# Patient Record
Sex: FEMALE | Race: WHITE | NOT HISPANIC OR LATINO | Employment: FULL TIME | URBAN - METROPOLITAN AREA
[De-identification: names, ages, dates, MRNs, and addresses within clinical notes are randomized per-mention and may not be internally consistent; named-entity substitution may affect disease eponyms.]

---

## 2022-05-16 DIAGNOSIS — N20.0 CALCULUS OF KIDNEY: Primary | ICD-10-CM

## 2022-05-17 ENCOUNTER — OFFICE VISIT (OUTPATIENT)
Dept: UROLOGY | Facility: CLINIC | Age: 52
End: 2022-05-17

## 2022-05-17 VITALS
WEIGHT: 174 LBS | HEART RATE: 79 BPM | SYSTOLIC BLOOD PRESSURE: 120 MMHG | BODY MASS INDEX: 32.02 KG/M2 | DIASTOLIC BLOOD PRESSURE: 72 MMHG | HEIGHT: 62 IN

## 2022-05-17 DIAGNOSIS — R10.9 ABDOMINAL PAIN, UNSPECIFIED ABDOMINAL LOCATION: ICD-10-CM

## 2022-05-17 DIAGNOSIS — N20.0 CALCULUS OF KIDNEY: ICD-10-CM

## 2022-05-17 PROCEDURE — 99204 PR OFFICE/OUTPT VISIT, NEW, LEVL IV, 45-59 MIN: ICD-10-PCS | Mod: S$GLB,,, | Performed by: UROLOGY

## 2022-05-17 PROCEDURE — 99204 OFFICE O/P NEW MOD 45 MIN: CPT | Mod: S$GLB,,, | Performed by: UROLOGY

## 2022-05-17 RX ORDER — ACYCLOVIR 200 MG/1
CAPSULE ORAL
COMMUNITY
Start: 2022-05-07

## 2022-05-17 NOTE — PROGRESS NOTES
Subjective:       Patient ID: Rosalinda Stewart is a 52 y.o. female.    Chief Complaint: Nephrolithiasis      HPI:  52-year-old female with a 9 mm left renal pelvic stone on plain film.  She has not had any CT imaging patient is symptomatic her total left-sided    Past Medical History:   Past Medical History:   Diagnosis Date    Anxiety     Fever blister     Kidney stone     Kidney stones     Mental disorder        Past Surgical Historical:   Past Surgical History:   Procedure Laterality Date    BREAST SURGERY      ENDOMETRIAL ABLATION      RENAL ARTERY STENT      TUBAL LIGATION          Medications:   Medication List with Changes/Refills   Current Medications    ACYCLOVIR (ZOVIRAX) 200 MG CAPSULE    Take by mouth 5 (five) times daily.   Discontinued Medications    DIAZEPAM (VALIUM) 5 MG TABLET        HYDROMORPHONE (DILAUDID) 2 MG TABLET        HYOSCYAMINE (LEVSIN/SL) 0.125 MG SUBL        ONDANSETRON (ZOFRAN) 4 MG TABLET        SERTRALINE (ZOLOFT) 100 MG TABLET        ZOLPIDEM (AMBIEN) 10 MG TAB            Past Social History:   Social History     Socioeconomic History    Marital status:    Occupational History    Occupation: dental assistant     Employer: [email or web address]   Tobacco Use    Smoking status: Never Smoker    Smokeless tobacco: Never Used   Substance and Sexual Activity    Alcohol use: Never    Drug use: No    Sexual activity: Not Currently     Partners: Male     Birth control/protection: Surgical       Allergies:   Review of patient's allergies indicates:   Allergen Reactions    Latex, natural rubber Anaphylaxis        Family History:   Family History   Problem Relation Age of Onset    Stroke Mother     Cancer Father     Heart disease Father     Melanoma Neg Hx     Psoriasis Neg Hx     Lupus Neg Hx     Eczema Neg Hx     Acne Neg Hx     Breast cancer Neg Hx     Colon cancer Neg Hx     Ovarian cancer Neg Hx         Review of Systems:  Review of Systems   Constitutional:  Negative for activity change and appetite change.   HENT: Negative for congestion and dental problem.    Respiratory: Negative for chest tightness and shortness of breath.    Cardiovascular: Negative for chest pain.   Gastrointestinal: Negative for abdominal distention and abdominal pain.   Genitourinary: Negative for decreased urine volume, difficulty urinating, dyspareunia, dysuria, enuresis, flank pain, frequency, genital sores, hematuria, pelvic pain and urgency.   Musculoskeletal: Negative for back pain and neck pain.   Allergic/Immunologic: Negative for immunocompromised state.   Neurological: Negative for dizziness.   Hematological: Negative for adenopathy.   Psychiatric/Behavioral: Negative for agitation, behavioral problems and confusion.       Physical Exam:  Physical Exam  Constitutional:       Appearance: She is well-developed.   HENT:      Head: Normocephalic and atraumatic.      Right Ear: External ear normal.      Left Ear: External ear normal.   Eyes:      Conjunctiva/sclera: Conjunctivae normal.   Neck:      Vascular: No JVD.   Cardiovascular:      Rate and Rhythm: Normal rate and regular rhythm.   Pulmonary:      Effort: Pulmonary effort is normal. No respiratory distress.      Breath sounds: Normal breath sounds. No wheezing.   Abdominal:      General: There is no distension.      Palpations: Abdomen is soft.      Tenderness: There is no abdominal tenderness. There is no rebound.   Musculoskeletal:         General: Normal range of motion.      Cervical back: Normal range of motion.   Skin:     General: Skin is warm and dry.      Findings: No erythema or rash.   Neurological:      Mental Status: She is alert and oriented to person, place, and time.   Psychiatric:         Behavior: Behavior normal.         Assessment/Plan:       Problem List Items Addressed This Visit    None     Visit Diagnoses     Calculus of kidney        Abdominal pain, unspecified abdominal location        Relevant Orders    CT  Renal Stone Study ABD Pelvis WO             52-year-old female with left flank pain.  She has a 9 mm calcification of the left kidney.  We will order CT stone protocol now and likely proceed with ureteroscopy

## 2022-05-19 ENCOUNTER — CLINICAL SUPPORT (OUTPATIENT)
Dept: UROLOGY | Facility: CLINIC | Age: 52
End: 2022-05-19

## 2022-05-19 DIAGNOSIS — N20.0 CALCULUS OF KIDNEY: Primary | ICD-10-CM

## 2022-05-19 LAB
ANION GAP SERPL CALC-SCNC: 2 MMOL/L (ref 3–11)
APPEARANCE, UA: CLEAR
BACTERIA SPEC CULT: ABNORMAL /HPF
BASOPHILS NFR BLD: 0.7 % (ref 0–3)
BILIRUB UR QL STRIP: NEGATIVE MG/DL
BUN SERPL-MCNC: 13 MG/DL (ref 7–18)
BUN/CREAT SERPL: 19.11 RATIO (ref 7–18)
CALCIUM SERPL-MCNC: 9.1 MG/DL (ref 8.8–10.5)
CHLORIDE SERPL-SCNC: 106 MMOL/L (ref 100–108)
CO2 SERPL-SCNC: 33 MMOL/L (ref 21–32)
COLOR UR: ABNORMAL
CREAT SERPL-MCNC: 0.68 MG/DL (ref 0.55–1.02)
EOSINOPHIL NFR BLD: 2.3 % (ref 1–3)
ERYTHROCYTE [DISTWIDTH] IN BLOOD BY AUTOMATED COUNT: 12.2 % (ref 12.5–18)
GFR ESTIMATION: > 60
GLUCOSE (UA): NORMAL MG/DL
GLUCOSE SERPL-MCNC: 124 MG/DL (ref 70–110)
HCG QUALITATIVE: NEGATIVE
HCT VFR BLD AUTO: 40.4 % (ref 37–47)
HGB BLD-MCNC: 13.5 G/DL (ref 12–16)
HGB UR QL STRIP: 150 /UL
KETONES UR QL STRIP: NEGATIVE MG/DL
LEUKOCYTE ESTERASE UR QL STRIP: 25 /UL
LYMPHOCYTES NFR BLD: 44.2 % (ref 25–40)
MCH RBC QN AUTO: 30.6 PG (ref 27–31.2)
MCHC RBC AUTO-ENTMCNC: 33.4 G/DL (ref 31.8–35.4)
MCV RBC AUTO: 91.6 FL (ref 80–97)
MONOCYTES NFR BLD: 8 % (ref 1–15)
NEUTROPHILS # BLD AUTO: 1.96 10*3/UL (ref 1.8–7.7)
NEUTROPHILS NFR BLD: 44.6 % (ref 37–80)
NITRITE UR QL STRIP: NEGATIVE
NUCLEATED RED BLOOD CELLS: 0 %
PH UR STRIP: 7 PH (ref 5–9)
PLATELETS: 275 10*3/UL (ref 142–424)
POTASSIUM SERPL-SCNC: 4 MMOL/L (ref 3.6–5.2)
PROT UR QL STRIP: ABNORMAL MG/DL
RBC # BLD AUTO: 4.41 10*6/UL (ref 4.2–5.4)
RBC #/AREA URNS HPF: ABNORMAL /HPF (ref 0–2)
SERVICE COMMENT 03: ABNORMAL
SODIUM BLD-SCNC: 141 MMOL/L (ref 135–145)
SP GR UR STRIP: 1.01 (ref 1–1.03)
SPECIMEN COLLECTION METHOD, URINE: ABNORMAL
SQUAMOUS EPITHELIAL, UA: ABNORMAL /LPF
UROBILINOGEN UR STRIP-ACNC: NORMAL MG/DL
WBC # BLD: 4.4 10*3/UL (ref 4.6–10.2)
WBC #/AREA URNS HPF: ABNORMAL /HPF (ref 0–5)

## 2022-05-19 NOTE — PROGRESS NOTES
Pre-op instructions and surgery consent L ESWL reviewed with pt. Pt verbalized understanding. Surgery consent signed by pt.

## 2022-05-20 ENCOUNTER — OUTSIDE PLACE OF SERVICE (OUTPATIENT)
Dept: UROLOGY | Facility: CLINIC | Age: 52
End: 2022-05-20

## 2022-05-20 ENCOUNTER — NURSE TRIAGE (OUTPATIENT)
Dept: ADMINISTRATIVE | Facility: CLINIC | Age: 52
End: 2022-05-20

## 2022-05-20 PROCEDURE — 50590 FRAGMENTING OF KIDNEY STONE: CPT | Mod: LT,,, | Performed by: UROLOGY

## 2022-05-20 PROCEDURE — 50590 PR FRAGMENT KIDNEY STONE/ ESWL: ICD-10-PCS | Mod: LT,,, | Performed by: UROLOGY

## 2022-05-20 NOTE — TELEPHONE ENCOUNTER
.  Pt's daughter called and mom was in severe pain. Pt had lithotripsy today and was passing fragments and was on the toilet and passed out and was vomiting. [place]. Pt cant get off the floor and told the daughter to call 911. Pts care advice is 911 pt asked about where to go and told her ambulance will probably take her to the nearest facility   .  Reason for Disposition   Shock suspected (e.g., cold/pale/clammy skin, too weak to stand, low BP, rapid pulse)    Additional Information   Negative: Passed out (i.e., lost consciousness, collapsed and was not responding)    Protocols used: BACK PAIN-A-AH

## 2022-05-23 ENCOUNTER — HOSPITAL ENCOUNTER (OUTPATIENT)
Dept: RADIOLOGY | Facility: CLINIC | Age: 52
Discharge: HOME | End: 2022-05-23
Attending: NURSE PRACTITIONER

## 2022-05-23 ENCOUNTER — OFFICE VISIT (OUTPATIENT)
Dept: UROLOGY | Facility: CLINIC | Age: 52
End: 2022-05-23

## 2022-05-23 ENCOUNTER — TELEPHONE (OUTPATIENT)
Dept: UROLOGY | Facility: CLINIC | Age: 52
End: 2022-05-23

## 2022-05-23 VITALS
SYSTOLIC BLOOD PRESSURE: 130 MMHG | HEIGHT: 62 IN | BODY MASS INDEX: 31.65 KG/M2 | WEIGHT: 172 LBS | HEART RATE: 80 BPM | DIASTOLIC BLOOD PRESSURE: 80 MMHG

## 2022-05-23 DIAGNOSIS — N20.0 KIDNEY STONE: ICD-10-CM

## 2022-05-23 DIAGNOSIS — N20.0 KIDNEY STONE: Primary | ICD-10-CM

## 2022-05-23 PROCEDURE — 74018 RADEX ABDOMEN 1 VIEW: CPT | Mod: TC,,, | Performed by: UROLOGY

## 2022-05-23 PROCEDURE — 99024 POSTOP FOLLOW-UP VISIT: CPT | Mod: S$GLB,,, | Performed by: NURSE PRACTITIONER

## 2022-05-23 PROCEDURE — 74018 XR KUB: ICD-10-PCS | Mod: TC,,, | Performed by: UROLOGY

## 2022-05-23 PROCEDURE — 99024 PR POST-OP FOLLOW-UP VISIT: ICD-10-PCS | Mod: S$GLB,,, | Performed by: NURSE PRACTITIONER

## 2022-05-23 PROCEDURE — 74018 XR KUB: ICD-10-PCS | Mod: 26,,, | Performed by: RADIOLOGY

## 2022-05-23 PROCEDURE — 74018 RADEX ABDOMEN 1 VIEW: CPT | Mod: 26,,, | Performed by: RADIOLOGY

## 2022-05-23 RX ORDER — PHENAZOPYRIDINE HYDROCHLORIDE 200 MG/1
200 TABLET, FILM COATED ORAL 3 TIMES DAILY PRN
COMMUNITY

## 2022-05-23 RX ORDER — ALPRAZOLAM 0.25 MG/1
0.25 TABLET ORAL
COMMUNITY

## 2022-05-23 RX ORDER — HYOSCYAMINE SULFATE 0.125 MG
0.12 TABLET ORAL
COMMUNITY
Start: 2022-05-21 | End: 2022-05-25 | Stop reason: SDUPTHER

## 2022-05-23 RX ORDER — ONDANSETRON 4 MG/1
4 TABLET, ORALLY DISINTEGRATING ORAL
COMMUNITY
Start: 2022-05-21

## 2022-05-23 RX ORDER — CIPROFLOXACIN 500 MG/1
500 TABLET ORAL
COMMUNITY

## 2022-05-23 NOTE — PROGRESS NOTES
"Subjective:       Patient ID: Rosalinda Stewart is a 52 y.o. female.    Chief Complaint: Nephrolithiasis (Pt reports she had to go to ED at [facility] Saturday for severe pain, ED doctor had to back in and "flush" because stone debris was stacked in ureter )      HPI: 52-year-old female, patient of Dr. Strauss, presents post ER visit.  Patient had and ESWL on 05/20/2021.  Patient started developing some left flank pain and went to the emergency room.  Patient was found to have obstructing stone fragments in the left ureter.  She underwent a ureteroscopic laser lithotripsy, ureteral dilation, and a urethral stent placement.    Patient presents today stating she was told she could have the stent removed today.  Patient is request have the stent removed.  She states it is very painful.  States he is unable to sleep.  States he is having urinary frequency and bladder spasms.  She denies any blood in urine.  Denies any fever.  Denies any body aches.  No other urinary complaints at this time.       Past Medical History:   Past Medical History:   Diagnosis Date    Anxiety     Fever blister     Kidney stone     Kidney stones     Mental disorder        Past Surgical Historical:   Past Surgical History:   Procedure Laterality Date    BREAST SURGERY      ENDOMETRIAL ABLATION      KIDNEY STONE SURGERY      LITHOTRIPSY      RENAL ARTERY STENT      TUBAL LIGATION          Medications:   Medication List with Changes/Refills   Current Medications    ACYCLOVIR (ZOVIRAX) 200 MG CAPSULE    Take by mouth 5 (five) times daily.    ALPRAZOLAM (XANAX) 0.25 MG TABLET    0.25 mg.    CIPROFLOXACIN HCL (CIPRO) 500 MG TABLET    Take 500 mg by mouth every 12 (twelve) hours.    HYOSCYAMINE (ANASPAZ,LEVSIN) 0.125 MG TAB    0.125 mg.    ONDANSETRON (ZOFRAN-ODT) 4 MG TBDL    4 mg.    PHENAZOPYRIDINE (PYRIDIUM) 200 MG TABLET    Take 200 mg by mouth 3 (three) times daily as needed for Pain.        Past Social History:   Social History "     Socioeconomic History    Marital status:    Occupational History    Occupation: dental assistant     Employer: [employer]   Tobacco Use    Smoking status: Never Smoker    Smokeless tobacco: Never Used   Substance and Sexual Activity    Alcohol use: Never    Drug use: No    Sexual activity: Not Currently     Partners: Male     Birth control/protection: Surgical       Allergies:   Review of patient's allergies indicates:   Allergen Reactions    Latex, natural rubber Anaphylaxis    Codeine Itching     Usually on second dose        Family History:   Family History   Problem Relation Age of Onset    Stroke Mother     Cancer Father     Heart disease Father     Melanoma Neg Hx     Psoriasis Neg Hx     Lupus Neg Hx     Eczema Neg Hx     Acne Neg Hx     Breast cancer Neg Hx     Colon cancer Neg Hx     Ovarian cancer Neg Hx         Review of Systems:  Review of Systems   Constitutional: Negative for activity change and appetite change.   HENT: Negative for congestion and dental problem.    Respiratory: Negative for chest tightness and shortness of breath.    Cardiovascular: Negative for chest pain.   Gastrointestinal: Negative for abdominal distention.   Genitourinary: Positive for frequency and urgency. Negative for decreased urine volume, difficulty urinating, dyspareunia, dysuria, enuresis, flank pain, genital sores, hematuria and pelvic pain.   Musculoskeletal: Negative for back pain and neck pain.   Allergic/Immunologic: Negative for immunocompromised state.   Neurological: Negative for dizziness.   Hematological: Negative for adenopathy.   Psychiatric/Behavioral: Negative for agitation, behavioral problems and confusion.       Physical Exam:  Physical Exam  Vitals and nursing note reviewed.   Constitutional:       Appearance: She is well-developed.   HENT:      Head: Normocephalic.   Cardiovascular:      Rate and Rhythm: Normal rate and regular rhythm.      Heart sounds: Normal heart  sounds.   Pulmonary:      Effort: Pulmonary effort is normal.      Breath sounds: Normal breath sounds.   Abdominal:      General: Bowel sounds are normal.      Palpations: Abdomen is soft.   Skin:     General: Skin is warm and dry.   Neurological:      Mental Status: She is alert and oriented to person, place, and time.       Urinalysis:  Moderate blood, red blood cells too numerous to count.  Abnormal leukocytes likely due to a 0.  KUB:  See radiology report.    Assessment/Plan:   Kidney stone with stent placement:  Patient to have stent removed.  She complains of pain.  Did discuss the risk of removing the stent at this time.  Patient stated understanding.  Patient has string attached.  Will go ahead and remove stent.  Patient will keep appointment Dr. Strauss as scheduled.    Follow-up sooner if needed.  Problem List Items Addressed This Visit    None     Visit Diagnoses     Kidney stone    -  Primary    Relevant Orders    POCT Urinalysis (w/Micro Option)    X-Ray KUB (Completed)

## 2022-05-25 NOTE — TELEPHONE ENCOUNTER
----- Message from Christelle Barrientos sent at 5/25/2022  8:20 AM CDT -----  Pt requesting refill - hyoscyamine (ANASPAZ,LEVSIN) 0.125 mg Tab    [facility] - [place] - [address]  [address]  [place] [address]  Phone: [phone] Fax: [phone]    Please call at [phone], pt states she only has one left and would like a call back when sent.

## 2022-06-01 RX ORDER — HYOSCYAMINE SULFATE 0.125 MG
125 TABLET ORAL 3 TIMES DAILY
Qty: 30 TABLET | Refills: 0 | Status: SHIPPED | OUTPATIENT
Start: 2022-06-01

## 2022-06-02 ENCOUNTER — HOSPITAL ENCOUNTER (OUTPATIENT)
Dept: RADIOLOGY | Facility: CLINIC | Age: 52
Discharge: HOME | End: 2022-06-02
Attending: UROLOGY

## 2022-06-02 ENCOUNTER — OFFICE VISIT (OUTPATIENT)
Dept: UROLOGY | Facility: CLINIC | Age: 52
End: 2022-06-02

## 2022-06-02 VITALS
HEIGHT: 62 IN | SYSTOLIC BLOOD PRESSURE: 138 MMHG | BODY MASS INDEX: 31.65 KG/M2 | WEIGHT: 172 LBS | TEMPERATURE: 99 F | HEART RATE: 83 BPM | DIASTOLIC BLOOD PRESSURE: 63 MMHG

## 2022-06-02 DIAGNOSIS — N20.0 KIDNEY STONE: ICD-10-CM

## 2022-06-02 DIAGNOSIS — N20.0 KIDNEY STONE: Primary | ICD-10-CM

## 2022-06-02 PROCEDURE — 99024 POSTOP FOLLOW-UP VISIT: CPT | Mod: S$GLB,,, | Performed by: UROLOGY

## 2022-06-02 PROCEDURE — 99024 PR POST-OP FOLLOW-UP VISIT: ICD-10-PCS | Mod: S$GLB,,, | Performed by: UROLOGY

## 2022-06-02 NOTE — PROGRESS NOTES
Subjective:       Patient ID: Rosalinda Stewart is a 52 y.o. female.    Chief Complaint: No chief complaint on file.      HPI:  52-year-old female postop as well she had a stone fragment stuck in her ureter and covering urologist performed ureteroscopy stent has already been removed patient is doing well has no complaints    Past Medical History:   Past Medical History:   Diagnosis Date    Anxiety     Fever blister     Kidney stone     Kidney stones     Mental disorder        Past Surgical Historical:   Past Surgical History:   Procedure Laterality Date    BREAST SURGERY      ENDOMETRIAL ABLATION      KIDNEY STONE SURGERY      LITHOTRIPSY      RENAL ARTERY STENT      TUBAL LIGATION          Medications:   Medication List with Changes/Refills   Current Medications    ACYCLOVIR (ZOVIRAX) 200 MG CAPSULE    Take by mouth 5 (five) times daily.    ALPRAZOLAM (XANAX) 0.25 MG TABLET    0.25 mg.    CIPROFLOXACIN HCL (CIPRO) 500 MG TABLET    Take 500 mg by mouth every 12 (twelve) hours.    HYOSCYAMINE (ANASPAZ,LEVSIN) 0.125 MG TAB    Take 1 tablet (125 mcg total) by mouth 3 (three) times daily.    ONDANSETRON (ZOFRAN-ODT) 4 MG TBDL    4 mg.    PHENAZOPYRIDINE (PYRIDIUM) 200 MG TABLET    Take 200 mg by mouth 3 (three) times daily as needed for Pain.        Past Social History:   Social History     Socioeconomic History    Marital status:    Occupational History    Occupation: dental assistant     Employer: [employer]   Tobacco Use    Smoking status: Never Smoker    Smokeless tobacco: Never Used   Substance and Sexual Activity    Alcohol use: Never    Drug use: No    Sexual activity: Not Currently     Partners: Male     Birth control/protection: Surgical       Allergies:   Review of patient's allergies indicates:   Allergen Reactions    Latex, natural rubber Anaphylaxis    Codeine Itching     Usually on second dose        Family History:   Family History   Problem Relation Age of Onset    Stroke  Mother     Cancer Father     Heart disease Father     Melanoma Neg Hx     Psoriasis Neg Hx     Lupus Neg Hx     Eczema Neg Hx     Acne Neg Hx     Breast cancer Neg Hx     Colon cancer Neg Hx     Ovarian cancer Neg Hx         Review of Systems:  Review of Systems   Constitutional: Negative for activity change and appetite change.   HENT: Negative for congestion and dental problem.    Respiratory: Negative for chest tightness and shortness of breath.    Cardiovascular: Negative for chest pain.   Gastrointestinal: Negative for abdominal distention and abdominal pain.   Genitourinary: Negative for decreased urine volume, difficulty urinating, dyspareunia, dysuria, enuresis, flank pain, frequency, genital sores, hematuria, pelvic pain and urgency.   Musculoskeletal: Negative for back pain and neck pain.   Allergic/Immunologic: Negative for immunocompromised state.   Neurological: Negative for dizziness.   Hematological: Negative for adenopathy.   Psychiatric/Behavioral: Negative for agitation, behavioral problems and confusion.       Physical Exam:  Physical Exam  Constitutional:       Appearance: She is well-developed.   HENT:      Head: Normocephalic and atraumatic.      Right Ear: External ear normal.      Left Ear: External ear normal.   Eyes:      Conjunctiva/sclera: Conjunctivae normal.   Neck:      Vascular: No JVD.   Cardiovascular:      Rate and Rhythm: Normal rate and regular rhythm.   Pulmonary:      Effort: Pulmonary effort is normal. No respiratory distress.      Breath sounds: Normal breath sounds. No wheezing.   Abdominal:      General: There is no distension.      Palpations: Abdomen is soft.      Tenderness: There is no abdominal tenderness. There is no rebound.   Musculoskeletal:         General: Normal range of motion.      Cervical back: Normal range of motion.   Skin:     General: Skin is warm and dry.      Findings: No erythema or rash.   Neurological:      Mental Status: She is alert and  oriented to person, place, and time.   Psychiatric:         Behavior: Behavior normal.         Assessment/Plan:       Problem List Items Addressed This Visit    None     Visit Diagnoses     Kidney stone    -  Primary    Relevant Orders    X-Ray Abdomen AP 1 View             Successful ESWL.  Return to clinic as needed